# Patient Record
Sex: FEMALE
[De-identification: names, ages, dates, MRNs, and addresses within clinical notes are randomized per-mention and may not be internally consistent; named-entity substitution may affect disease eponyms.]

---

## 2018-08-10 ENCOUNTER — HOSPITAL ENCOUNTER (OUTPATIENT)
Dept: HOSPITAL 92 - BICMAMMO | Age: 63
Discharge: HOME | End: 2018-08-10
Attending: FAMILY MEDICINE
Payer: COMMERCIAL

## 2018-08-10 DIAGNOSIS — Z80.3: ICD-10-CM

## 2018-08-10 DIAGNOSIS — Z00.00: ICD-10-CM

## 2018-08-10 DIAGNOSIS — Z12.31: Primary | ICD-10-CM

## 2018-08-10 DIAGNOSIS — Z13.820: ICD-10-CM

## 2018-08-10 DIAGNOSIS — M85.89: ICD-10-CM

## 2018-08-10 PROCEDURE — 77063 BREAST TOMOSYNTHESIS BI: CPT

## 2018-08-10 PROCEDURE — 77080 DXA BONE DENSITY AXIAL: CPT

## 2018-08-10 PROCEDURE — 77067 SCR MAMMO BI INCL CAD: CPT

## 2018-10-01 ENCOUNTER — HOSPITAL ENCOUNTER (EMERGENCY)
Dept: HOSPITAL 92 - ERS | Age: 63
Discharge: HOME | End: 2018-10-01
Payer: COMMERCIAL

## 2018-10-01 DIAGNOSIS — Z79.899: ICD-10-CM

## 2018-10-01 DIAGNOSIS — F41.9: ICD-10-CM

## 2018-10-01 DIAGNOSIS — Z87.891: ICD-10-CM

## 2018-10-01 DIAGNOSIS — R42: ICD-10-CM

## 2018-10-01 DIAGNOSIS — I10: Primary | ICD-10-CM

## 2018-10-01 LAB
ALBUMIN SERPL BCG-MCNC: 4 G/DL (ref 3.4–4.8)
ALP SERPL-CCNC: 72 U/L (ref 40–150)
ALT SERPL W P-5'-P-CCNC: 38 U/L (ref 8–55)
ANION GAP SERPL CALC-SCNC: 13 MMOL/L (ref 10–20)
AST SERPL-CCNC: 30 U/L (ref 5–34)
BASOPHILS # BLD AUTO: 0.1 THOU/UL (ref 0–0.2)
BASOPHILS NFR BLD AUTO: 0.7 % (ref 0–1)
BILIRUB SERPL-MCNC: 0.4 MG/DL (ref 0.2–1.2)
BUN SERPL-MCNC: 13 MG/DL (ref 9.8–20.1)
CALCIUM SERPL-MCNC: 9.3 MG/DL (ref 7.8–10.44)
CHLORIDE SERPL-SCNC: 107 MMOL/L (ref 98–107)
CK MB SERPL-MCNC: 1 NG/ML (ref 0–6.6)
CO2 SERPL-SCNC: 21 MMOL/L (ref 23–31)
CREAT CL PREDICTED SERPL C-G-VRATE: 0 ML/MIN (ref 70–130)
EOSINOPHIL # BLD AUTO: 0.2 THOU/UL (ref 0–0.7)
EOSINOPHIL NFR BLD AUTO: 2.7 % (ref 0–10)
GLOBULIN SER CALC-MCNC: 3 G/DL (ref 2.4–3.5)
GLUCOSE SERPL-MCNC: 154 MG/DL (ref 80–115)
HGB BLD-MCNC: 13.7 G/DL (ref 12–16)
LIPASE SERPL-CCNC: 29 U/L (ref 8–78)
LYMPHOCYTES # BLD: 2.6 THOU/UL (ref 1.2–3.4)
LYMPHOCYTES NFR BLD AUTO: 34.4 % (ref 21–51)
MCH RBC QN AUTO: 27.9 PG (ref 27–31)
MCV RBC AUTO: 86.7 FL (ref 78–98)
MONOCYTES # BLD AUTO: 0.5 THOU/UL (ref 0.11–0.59)
MONOCYTES NFR BLD AUTO: 7 % (ref 0–10)
NEUTROPHILS # BLD AUTO: 4.2 THOU/UL (ref 1.4–6.5)
NEUTROPHILS NFR BLD AUTO: 55.2 % (ref 42–75)
PLATELET # BLD AUTO: 295 THOU/UL (ref 130–400)
POTASSIUM SERPL-SCNC: 3.7 MMOL/L (ref 3.5–5.1)
RBC # BLD AUTO: 4.89 MILL/UL (ref 4.2–5.4)
SODIUM SERPL-SCNC: 137 MMOL/L (ref 136–145)
TROPONIN I SERPL DL<=0.01 NG/ML-MCNC: (no result) NG/ML (ref ?–0.03)
WBC # BLD AUTO: 7.5 THOU/UL (ref 4.8–10.8)

## 2018-10-01 PROCEDURE — 85025 COMPLETE CBC W/AUTO DIFF WBC: CPT

## 2018-10-01 PROCEDURE — 93005 ELECTROCARDIOGRAM TRACING: CPT

## 2018-10-01 PROCEDURE — 80053 COMPREHEN METABOLIC PANEL: CPT

## 2018-10-01 PROCEDURE — 83690 ASSAY OF LIPASE: CPT

## 2018-10-01 PROCEDURE — 96360 HYDRATION IV INFUSION INIT: CPT

## 2018-10-01 PROCEDURE — 84484 ASSAY OF TROPONIN QUANT: CPT

## 2018-10-01 PROCEDURE — 82553 CREATINE MB FRACTION: CPT

## 2018-10-01 PROCEDURE — 36415 COLL VENOUS BLD VENIPUNCTURE: CPT

## 2018-10-24 ENCOUNTER — HOSPITAL ENCOUNTER (OUTPATIENT)
Dept: HOSPITAL 92 - BICRAD | Age: 63
Discharge: HOME | End: 2018-10-24
Attending: FAMILY MEDICINE
Payer: COMMERCIAL

## 2018-10-24 DIAGNOSIS — R10.12: Primary | ICD-10-CM

## 2018-10-24 PROCEDURE — 74019 RADEX ABDOMEN 2 VIEWS: CPT

## 2018-10-24 NOTE — RAD
ABDOMEN TWO VIEWS:

 

History: 

63-year-old female with history of left upper quadrant pain. 

 

FINDINGS: 

There is some gas and fecal material in the colon. No evidence for free intraperitoneal air. No large
 or small bowel obstruction. No overt calculus. 

 

IMPRESSION: 

Unremarkable abdomen two views.

 

POS: [facility]

## 2020-06-05 ENCOUNTER — HOSPITAL ENCOUNTER (OUTPATIENT)
Dept: HOSPITAL 92 - BICMAMMO | Age: 65
Discharge: HOME | End: 2020-06-05
Attending: PHYSICIAN ASSISTANT
Payer: COMMERCIAL

## 2020-06-05 DIAGNOSIS — N63.20: Primary | ICD-10-CM

## 2020-06-05 PROCEDURE — 77066 DX MAMMO INCL CAD BI: CPT

## 2020-06-05 PROCEDURE — G0279 TOMOSYNTHESIS, MAMMO: HCPCS

## 2020-06-05 NOTE — MMO
Bilateral MAMMO Bilat Diag [facility].

 

CLINICAL HISTORY:

Patient is 64 years old and is seen for diagnostic exam,lump or thickening and

pain in the left breast. The patient has the following family history of breast

cancer:  cousin female, malignant (generic), X2.  The patient has no personal

history of cancer. The patient has a history of right Excisional Biopsy in 1982

- benign.

 

VIEWS:

The views performed were:  bilateral craniocaudal with tomosynthesis; bilateral

mediolateral oblique with tomosynthesis; and bilateral mediolateral with

tomosynthesis.

 

FILMS COMPARED:

The present examination has been compared to prior imaging studies performed at

[facility] on 09/11/2014, 10/04/2016, 08/10/2018 and 06/05/2020.

 

This study has been interpreted with the assistance of computer-aided detection.

 

MAMMOGRAM FINDINGS:

The breasts are heterogeneously dense, which could obscure a lesion on

mammography.

 

Benign calcifications are noted bilaterally.

 

There are no suspicious masses, suspicious calcifications, or new areas of

architectural distortion.

 

IMPRESSION:

THERE IS NO MAMMOGRAPHIC EVIDENCE OF MALIGNANCY.

 

A ROUTINE FOLLOW-UP MAMMOGRAM IN 1 YEAR IS RECOMMENDED.

 

THE RESULTS OF THIS EXAM WERE SENT TO THE PATIENT.

 

ACR BI-RADS Category 2 - Benign finding

 

MAMMOGRAPHY NOTE:

 1. A negative mammogram report should not delay a biopsy if a dominant of

 clinically suspicious mass is present.

 2. Approximately 10% to 15% of breast cancers are not detected by

 mammography.

 3. Adenosis and dense breasts may obscure an underlying neoplasm.

 

 

Reported by: ARTIE CASTILLO MD

Electonically Signed: [id number]

## 2020-06-05 NOTE — ULT
LEFT BREAST ULTRASOUND:

6/5/20

 

HISTORY: 

Left breast pain at the 7-8 o'clock position. 

 

FINDINGS: 

Correlation is made with mammogram of same date. 

 

Sonographic evaluation of region of pain at the 7 and 8 o'clock positions of the left breast demonstr
ates no abnormality. 

 

IMPRESSION: 

BIRADS 2: Benign Finding(s) 

  Routine annual screening mammography (for women over age 40).

## 2020-07-02 ENCOUNTER — HOSPITAL ENCOUNTER (EMERGENCY)
Dept: HOSPITAL 92 - ERS | Age: 65
Discharge: HOME | End: 2020-07-02
Payer: COMMERCIAL

## 2020-07-02 DIAGNOSIS — F41.9: ICD-10-CM

## 2020-07-02 DIAGNOSIS — U07.1: Primary | ICD-10-CM

## 2020-07-02 DIAGNOSIS — Z87.891: ICD-10-CM

## 2020-07-02 DIAGNOSIS — Z79.899: ICD-10-CM

## 2020-07-02 DIAGNOSIS — I10: ICD-10-CM

## 2020-07-02 DIAGNOSIS — J32.9: ICD-10-CM

## 2020-07-02 LAB
ALBUMIN SERPL BCG-MCNC: 4.3 G/DL (ref 3.4–4.8)
ALP SERPL-CCNC: 81 U/L (ref 40–110)
ALT SERPL W P-5'-P-CCNC: 30 U/L (ref 8–55)
ANION GAP SERPL CALC-SCNC: 17 MMOL/L (ref 10–20)
AST SERPL-CCNC: 34 U/L (ref 5–34)
BASOPHILS # BLD AUTO: 0.1 THOU/UL (ref 0–0.2)
BASOPHILS NFR BLD AUTO: 0.5 % (ref 0–1)
BILIRUB SERPL-MCNC: 0.4 MG/DL (ref 0.2–1.2)
BUN SERPL-MCNC: 12 MG/DL (ref 9.8–20.1)
CALCIUM SERPL-MCNC: 9.5 MG/DL (ref 7.8–10.44)
CHLORIDE SERPL-SCNC: 104 MMOL/L (ref 98–107)
CK SERPL-CCNC: 77 U/L (ref 29–168)
CO2 SERPL-SCNC: 22 MMOL/L (ref 23–31)
CREAT CL PREDICTED SERPL C-G-VRATE: 0 ML/MIN (ref 70–130)
EOSINOPHIL # BLD AUTO: 0 THOU/UL (ref 0–0.7)
EOSINOPHIL NFR BLD AUTO: 0.3 % (ref 0–10)
GLOBULIN SER CALC-MCNC: 3.8 G/DL (ref 2.4–3.5)
GLUCOSE SERPL-MCNC: 118 MG/DL (ref 80–115)
HGB BLD-MCNC: 14 G/DL (ref 12–16)
LIPASE SERPL-CCNC: 15 U/L (ref 8–78)
LYMPHOCYTES # BLD: 1.6 THOU/UL (ref 1.2–3.4)
LYMPHOCYTES NFR BLD AUTO: 17.1 % (ref 21–51)
MCH RBC QN AUTO: 28.1 PG (ref 27–31)
MCV RBC AUTO: 84.2 FL (ref 78–98)
MONOCYTES # BLD AUTO: 0.4 THOU/UL (ref 0.11–0.59)
MONOCYTES NFR BLD AUTO: 4.5 % (ref 0–10)
NEUTROPHILS # BLD AUTO: 7.2 THOU/UL (ref 1.4–6.5)
NEUTROPHILS NFR BLD AUTO: 77.5 % (ref 42–75)
PLATELET # BLD AUTO: 295 THOU/UL (ref 130–400)
POTASSIUM SERPL-SCNC: 4.6 MMOL/L (ref 3.5–5.1)
RBC # BLD AUTO: 4.99 MILL/UL (ref 4.2–5.4)
SODIUM SERPL-SCNC: 138 MMOL/L (ref 136–145)
WBC # BLD AUTO: 9.3 THOU/UL (ref 4.8–10.8)

## 2020-07-02 PROCEDURE — 94760 N-INVAS EAR/PLS OXIMETRY 1: CPT

## 2020-07-02 PROCEDURE — 93005 ELECTROCARDIOGRAM TRACING: CPT

## 2020-07-02 PROCEDURE — 36415 COLL VENOUS BLD VENIPUNCTURE: CPT

## 2020-07-02 PROCEDURE — 83690 ASSAY OF LIPASE: CPT

## 2020-07-02 PROCEDURE — 71045 X-RAY EXAM CHEST 1 VIEW: CPT

## 2020-07-02 PROCEDURE — 85025 COMPLETE CBC W/AUTO DIFF WBC: CPT

## 2020-07-02 PROCEDURE — 80053 COMPREHEN METABOLIC PANEL: CPT

## 2020-07-02 PROCEDURE — 84484 ASSAY OF TROPONIN QUANT: CPT

## 2020-07-02 PROCEDURE — 82550 ASSAY OF CK (CPK): CPT

## 2020-07-02 NOTE — RAD
Exam: Chest one view



HISTORY:Positive COVID patient. Chest pain.



Comparison: None



FINDINGS:

Cardiac silhouette: Normal

Aorta: Unremarkable

Pulmonary vessels: Normal

Costophrenic angles: Clear



LUNGS: No masses or consolidation.



Pneumothorax: None



Osseous abnormalities: None



IMPRESSION: No acute cardiopulmonary process.



Reported By: Christ Vaca 

Electronically Signed:  7/2/2020 2:15 PM